# Patient Record
Sex: MALE | Race: WHITE | Employment: FULL TIME | URBAN - METROPOLITAN AREA
[De-identification: names, ages, dates, MRNs, and addresses within clinical notes are randomized per-mention and may not be internally consistent; named-entity substitution may affect disease eponyms.]

---

## 2017-02-10 PROBLEM — R10.13 EPIGASTRIC ABDOMINAL PAIN: Status: ACTIVE | Noted: 2017-02-10

## 2017-02-11 PROBLEM — K25.9 GASTRIC ULCER: Status: ACTIVE | Noted: 2017-02-11

## 2017-02-11 PROBLEM — K20.0 EOSINOPHILIC ESOPHAGITIS: Status: ACTIVE | Noted: 2017-02-11

## 2017-02-18 ENCOUNTER — HOSPITAL ENCOUNTER (OUTPATIENT)
Dept: OTHER | Age: 32
Discharge: HOME | End: 2017-02-18
Attending: INTERNAL MEDICINE | Admitting: INTERNAL MEDICINE

## 2017-02-18 LAB
C-REACTIVE PROTEIN: 3.6 MG/L (ref 0–5.1)
SEDIMENTATION RATE, ERYTHROCYTE: 17 MM/HR (ref 0–15)

## 2017-02-20 LAB
ANA INTERPRETATION: NORMAL
ANCA IFA: NORMAL
ANTI-NUCLEAR ANTIBODY (ANA): NEGATIVE

## 2017-02-22 LAB — MISCELLANEOUS LAB TEST ORDER: NORMAL

## 2017-03-08 PROBLEM — F41.9 ANXIETY: Status: ACTIVE | Noted: 2017-03-08

## 2017-03-08 PROBLEM — K21.9 GERD (GASTROESOPHAGEAL REFLUX DISEASE): Status: ACTIVE | Noted: 2017-03-08

## 2017-03-17 PROBLEM — R74.8 ELEVATED LIPASE: Status: ACTIVE | Noted: 2017-03-17

## 2017-03-17 PROBLEM — N18.6 ESRD (END STAGE RENAL DISEASE) (HCC): Status: ACTIVE | Noted: 2017-03-17

## 2017-03-17 PROBLEM — R04.2 HEMOPTYSIS: Status: ACTIVE | Noted: 2017-03-17

## 2017-03-17 PROBLEM — R07.9 CHEST PAIN: Status: ACTIVE | Noted: 2017-03-17

## 2017-06-02 PROBLEM — K85.90 PANCREATITIS: Status: ACTIVE | Noted: 2017-06-02

## 2017-06-02 PROBLEM — R10.9 ABDOMINAL PAIN: Status: ACTIVE | Noted: 2017-06-02

## 2017-06-02 PROBLEM — Z99.2 HEMODIALYSIS PATIENT (HCC): Status: ACTIVE | Noted: 2017-06-02

## 2017-06-02 PROBLEM — N18.9 CHRONIC KIDNEY DISEASE: Status: ACTIVE | Noted: 2017-06-02

## 2017-06-22 ENCOUNTER — HOSPITAL ENCOUNTER (OUTPATIENT)
Dept: SURGERY | Age: 32
Discharge: HOME | End: 2017-06-22
Attending: INTERNAL MEDICINE | Admitting: INTERNAL MEDICINE

## 2017-06-22 VITALS
SYSTOLIC BLOOD PRESSURE: 114 MMHG | DIASTOLIC BLOOD PRESSURE: 60 MMHG | OXYGEN SATURATION: 100 % | HEART RATE: 62 BPM | RESPIRATION RATE: 16 BRPM | WEIGHT: 181 LBS | HEIGHT: 70 IN | TEMPERATURE: 98.1 F | BODY MASS INDEX: 25.91 KG/M2

## 2017-06-22 DIAGNOSIS — R93.3 ABNORMAL FINDINGS ON DIAGNOSTIC IMAGING OF OTHER PARTS OF DIGESTIVE TRACT: ICD-10-CM

## 2017-06-22 LAB
ANION GAP SERPL CALCULATED.3IONS-SCNC: 15 MMOL/L (ref 3–16)
BUN BLDV-MCNC: 21 MG/DL (ref 7–20)
CALCIUM SERPL-MCNC: 9.8 MG/DL (ref 8.3–10.6)
CHLORIDE BLD-SCNC: 95 MMOL/L (ref 99–110)
CO2: 31 MMOL/L (ref 21–32)
CREAT SERPL-MCNC: 6.3 MG/DL (ref 0.9–1.3)
GFR AFRICAN AMERICAN: 13
GFR NON-AFRICAN AMERICAN: 10
GLUCOSE BLD-MCNC: 98 MG/DL (ref 70–99)
POTASSIUM SERPL-SCNC: 4.3 MMOL/L (ref 3.5–5.1)
SODIUM BLD-SCNC: 141 MMOL/L (ref 136–145)

## 2017-06-22 RX ORDER — SODIUM CHLORIDE, SODIUM LACTATE, POTASSIUM CHLORIDE, CALCIUM CHLORIDE 600; 310; 30; 20 MG/100ML; MG/100ML; MG/100ML; MG/100ML
INJECTION, SOLUTION INTRAVENOUS ONCE
Status: DISCONTINUED | OUTPATIENT
Start: 2017-06-22 | End: 2017-06-23 | Stop reason: HOSPADM

## 2017-06-22 RX ORDER — SODIUM CHLORIDE 9 MG/ML
INJECTION, SOLUTION INTRAVENOUS CONTINUOUS
Status: DISCONTINUED | OUTPATIENT
Start: 2017-06-22 | End: 2017-06-23 | Stop reason: HOSPADM

## 2017-06-22 RX ORDER — LIDOCAINE HYDROCHLORIDE 10 MG/ML
0.1 INJECTION, SOLUTION EPIDURAL; INFILTRATION; INTRACAUDAL; PERINEURAL
Status: ACTIVE | OUTPATIENT
Start: 2017-06-22 | End: 2017-06-22

## 2017-06-22 RX ADMIN — SODIUM CHLORIDE: 9 INJECTION, SOLUTION INTRAVENOUS at 08:37

## 2017-06-22 ASSESSMENT — ENCOUNTER SYMPTOMS
CONSTIPATION: 1
ABDOMINAL PAIN: 1

## 2017-06-22 ASSESSMENT — PAIN - FUNCTIONAL ASSESSMENT: PAIN_FUNCTIONAL_ASSESSMENT: 0-10

## 2022-06-14 ENCOUNTER — APPOINTMENT (OUTPATIENT)
Dept: GENERAL RADIOLOGY | Age: 37
End: 2022-06-14
Payer: COMMERCIAL

## 2022-06-14 ENCOUNTER — APPOINTMENT (OUTPATIENT)
Dept: CT IMAGING | Age: 37
End: 2022-06-14
Payer: COMMERCIAL

## 2022-06-14 ENCOUNTER — HOSPITAL ENCOUNTER (EMERGENCY)
Age: 37
Discharge: HOME | End: 2022-06-14
Payer: COMMERCIAL

## 2022-06-14 VITALS
HEART RATE: 69 BPM | DIASTOLIC BLOOD PRESSURE: 97 MMHG | RESPIRATION RATE: 13 BRPM | OXYGEN SATURATION: 97 % | TEMPERATURE: 98 F | BODY MASS INDEX: 25.77 KG/M2 | HEIGHT: 70 IN | WEIGHT: 180 LBS | SYSTOLIC BLOOD PRESSURE: 135 MMHG

## 2022-06-14 DIAGNOSIS — R07.9 CHEST PAIN, UNSPECIFIED TYPE: Primary | ICD-10-CM

## 2022-06-14 DIAGNOSIS — R04.2 HEMOPTYSIS: ICD-10-CM

## 2022-06-14 DIAGNOSIS — R93.2 ABNORMAL CT OF LIVER: ICD-10-CM

## 2022-06-14 LAB
A/G RATIO: 2.2 (ref 1.1–2.2)
ALBUMIN SERPL-MCNC: 4.7 G/DL (ref 3.4–5)
ALP BLD-CCNC: 88 U/L (ref 40–129)
ALT SERPL-CCNC: 56 U/L (ref 10–40)
ANION GAP SERPL CALCULATED.3IONS-SCNC: 12 MMOL/L (ref 3–16)
AST SERPL-CCNC: 37 U/L (ref 15–37)
BASOPHILS ABSOLUTE: 0 K/UL (ref 0–0.2)
BASOPHILS RELATIVE PERCENT: 1.1 %
BILIRUB SERPL-MCNC: 0.4 MG/DL (ref 0–1)
BILIRUBIN URINE: NEGATIVE
BLOOD, URINE: NEGATIVE
BUN BLDV-MCNC: 12 MG/DL (ref 7–20)
CALCIUM SERPL-MCNC: 10 MG/DL (ref 8.3–10.6)
CHLORIDE BLD-SCNC: 104 MMOL/L (ref 99–110)
CLARITY: CLEAR
CO2: 23 MMOL/L (ref 21–32)
COLOR: YELLOW
CREAT SERPL-MCNC: 1.3 MG/DL (ref 0.9–1.3)
EKG ATRIAL RATE: 89 BPM
EKG DIAGNOSIS: NORMAL
EKG P AXIS: 45 DEGREES
EKG P-R INTERVAL: 154 MS
EKG Q-T INTERVAL: 358 MS
EKG QRS DURATION: 88 MS
EKG QTC CALCULATION (BAZETT): 435 MS
EKG R AXIS: 25 DEGREES
EKG T AXIS: 26 DEGREES
EKG VENTRICULAR RATE: 89 BPM
EOSINOPHILS ABSOLUTE: 0.3 K/UL (ref 0–0.6)
EOSINOPHILS RELATIVE PERCENT: 7 %
EPITHELIAL CELLS, UA: NORMAL /HPF (ref 0–5)
GFR AFRICAN AMERICAN: >60
GFR NON-AFRICAN AMERICAN: >60
GLUCOSE BLD-MCNC: 177 MG/DL (ref 70–99)
GLUCOSE URINE: NEGATIVE MG/DL
HCT VFR BLD CALC: 43.2 % (ref 40.5–52.5)
HEMOGLOBIN: 14.6 G/DL (ref 13.5–17.5)
KETONES, URINE: NEGATIVE MG/DL
LEUKOCYTE ESTERASE, URINE: NEGATIVE
LYMPHOCYTES ABSOLUTE: 1.6 K/UL (ref 1–5.1)
LYMPHOCYTES RELATIVE PERCENT: 36.4 %
MCH RBC QN AUTO: 27.7 PG (ref 26–34)
MCHC RBC AUTO-ENTMCNC: 33.8 G/DL (ref 31–36)
MCV RBC AUTO: 81.9 FL (ref 80–100)
MICROSCOPIC EXAMINATION: YES
MONOCYTES ABSOLUTE: 0.5 K/UL (ref 0–1.3)
MONOCYTES RELATIVE PERCENT: 10.7 %
NEUTROPHILS ABSOLUTE: 1.9 K/UL (ref 1.7–7.7)
NEUTROPHILS RELATIVE PERCENT: 44.8 %
NITRITE, URINE: NEGATIVE
PDW BLD-RTO: 13.8 % (ref 12.4–15.4)
PH UA: 7.5 (ref 5–8)
PLATELET # BLD: 239 K/UL (ref 135–450)
PMV BLD AUTO: 7.6 FL (ref 5–10.5)
POTASSIUM SERPL-SCNC: 4 MMOL/L (ref 3.5–5.1)
PROTEIN UA: ABNORMAL MG/DL
RBC # BLD: 5.28 M/UL (ref 4.2–5.9)
RBC UA: NORMAL /HPF (ref 0–4)
SODIUM BLD-SCNC: 139 MMOL/L (ref 136–145)
SPECIFIC GRAVITY UA: 1.01 (ref 1–1.03)
TOTAL PROTEIN: 6.8 G/DL (ref 6.4–8.2)
TROPONIN: <0.01 NG/ML
TROPONIN: <0.01 NG/ML
URINE REFLEX TO CULTURE: ABNORMAL
URINE TYPE: ABNORMAL
UROBILINOGEN, URINE: 0.2 E.U./DL
WBC # BLD: 4.3 K/UL (ref 4–11)
WBC UA: NORMAL /HPF (ref 0–5)

## 2022-06-14 PROCEDURE — 84484 ASSAY OF TROPONIN QUANT: CPT

## 2022-06-14 PROCEDURE — 93010 ELECTROCARDIOGRAM REPORT: CPT | Performed by: INTERNAL MEDICINE

## 2022-06-14 PROCEDURE — 71045 X-RAY EXAM CHEST 1 VIEW: CPT

## 2022-06-14 PROCEDURE — 81001 URINALYSIS AUTO W/SCOPE: CPT

## 2022-06-14 PROCEDURE — 6370000000 HC RX 637 (ALT 250 FOR IP): Performed by: NURSE PRACTITIONER

## 2022-06-14 PROCEDURE — 99285 EMERGENCY DEPT VISIT HI MDM: CPT

## 2022-06-14 PROCEDURE — 85025 COMPLETE CBC W/AUTO DIFF WBC: CPT

## 2022-06-14 PROCEDURE — 6360000004 HC RX CONTRAST MEDICATION: Performed by: NURSE PRACTITIONER

## 2022-06-14 PROCEDURE — 93005 ELECTROCARDIOGRAM TRACING: CPT | Performed by: NURSE PRACTITIONER

## 2022-06-14 PROCEDURE — 2580000003 HC RX 258: Performed by: NURSE PRACTITIONER

## 2022-06-14 PROCEDURE — 80053 COMPREHEN METABOLIC PANEL: CPT

## 2022-06-14 PROCEDURE — 71260 CT THORAX DX C+: CPT

## 2022-06-14 RX ORDER — 0.9 % SODIUM CHLORIDE 0.9 %
1000 INTRAVENOUS SOLUTION INTRAVENOUS ONCE
Status: COMPLETED | OUTPATIENT
Start: 2022-06-14 | End: 2022-06-14

## 2022-06-14 RX ORDER — ACETAMINOPHEN 500 MG
1000 TABLET ORAL ONCE
Status: COMPLETED | OUTPATIENT
Start: 2022-06-14 | End: 2022-06-14

## 2022-06-14 RX ADMIN — ACETAMINOPHEN 1000 MG: 500 TABLET ORAL at 11:20

## 2022-06-14 RX ADMIN — IOPAMIDOL 75 ML: 755 INJECTION, SOLUTION INTRAVENOUS at 12:37

## 2022-06-14 RX ADMIN — SODIUM CHLORIDE 1000 ML: 9 INJECTION, SOLUTION INTRAVENOUS at 12:47

## 2022-06-14 ASSESSMENT — PAIN - FUNCTIONAL ASSESSMENT: PAIN_FUNCTIONAL_ASSESSMENT: 0-10

## 2022-06-14 ASSESSMENT — PAIN SCALES - GENERAL
PAINLEVEL_OUTOF10: 8
PAINLEVEL_OUTOF10: 8

## 2022-06-14 NOTE — ED PROVIDER NOTES
Evaluated by Advanced Practice Provider    [facility]  ED    CHIEF COMPLAINT  Chest Pain (started while driving to work at this am, no radiating pain, constant pain, \" drilling feeling\")    HISTORY OF PRESENT ILLNESS  Mendy Lagos is a 40 y.o. male who presents to the ED complaining of CP. Was driving to work and started coughing up blood and then his chest started hurting, right in the middle of the sternum. This started around 10 am, pain has been constant since it started. Describes the pain as like someone is running a drill through you. Pain does not radiate. Felt more SOB when he was coughing, like he was coughing all the air out of him. He had to stop and pull over, couldn't catch his breath. He now feels fine breathing wise. He felt a little nauseous, no vomiting. Denies being sick prior to this. Reports coughing up blood, states his hand was covered and it looked like a clot, it was just one time. Denies abdominal pain. He is numb/tingling all over, feels like his body has gone to sleep. He has a strong numbness behind his eyes. Has had kidney transplant-6 years ago and history of testicular cancer-6 years ago. The patient is currently rating their pain as 8/10 and describes it as a drilling, sharp type of pain. The patient denies history of a blood clot in the past.   The patient denies history of surgery, travel, or trauma in the past 4 weeks. The patient reports cough/hemoptysis. The patient does not take estrogen supplements. There is no unilateral leg swelling. Treatments tried prior to arrival in the ED include: none. The patient arrived to the ED via private car.     PAST MEDICAL HISTORY    Past Medical History:   Diagnosis Date    ARF (acute renal failure) ([address]     Asthma     as a child    Cancer [facility]     testicular    Dialysis patient ([facility] 12/2016    M,W,F    E. coli gastroenteritis     AGE 6, HOSPITALIZED    ESRD (end stage renal disease) [facility]     Gastric ulcer     Hypertension        SURGICAL HISTORY    Past Surgical History:   Procedure Laterality Date    ANTERIOR CRUCIATE LIGAMENT REPAIR Right     COLONOSCOPY  2017    COLONOSCOPY  2017    bx    DIALYSIS FISTULA CREATION  2016    HERNIA REPAIR Bilateral     INGUINAL, AS BABY    KIDNEY TRANSPLANT      NASAL FRACTURE SURGERY      x2    UPPER GASTROINTESTINAL ENDOSCOPY  2017    bx small bowel     CURRENT MEDICATIONS    Current Outpatient Rx   Medication Sig Dispense Refill    gabapentin (NEURONTIN) 300 MG capsule Take 1 po qhs X1 wk, 1 po BID X1 wk then 1 po TID thereafter 90 capsule 0    Ferric Citrate (AURYXIA PO) Take 3 tablets by mouth 3 times daily With meals      hydrALAZINE (APRESOLINE) 50 MG tablet Take 50 mg by mouth 2 times daily      amLODIPine (NORVASC) 10 MG tablet Take 10 mg by mouth daily      fluticasone (FLONASE) 50 MCG/ACT nasal spray 1 spray by Nasal route as needed       Fexofenadine HCl (ALLEGRA ALLERGY PO) Take 1 tablet by mouth daily as needed       NONFORMULARY daily Dialysis Vitamin        ALLERGIES    Allergies   Allergen Reactions    Losartan Swelling    Morphine Itching     FAMILY HISTORY    History reviewed. No pertinent family history.     SOCIAL HISTORY    Social History     Socioeconomic History    Marital status: Single     Spouse name: None    Number of children: None    Years of education: None    Highest education level: None   Occupational History    None   Tobacco Use    Smoking status: Former Smoker     Packs/day: 0.25     Years: 10.00     Pack years: 2.50     Types: Cigarettes     Quit date: 10/5/2010     Years since quittin.6    Smokeless tobacco: Former User     Types: Chew   Substance and Sexual Activity    Alcohol use: No    Drug use: No    Sexual activity: None   Other Topics Concern    None   Social History Narrative    None     Social Determinants of Health     Financial Resource Strain:    [facility] Difficulty of Paying Living Expenses: Not on file   Food Insecurity:     Worried About Running Out of Food in the Last Year: Not on file    Ran Out of Food in the Last Year: Not on file   Transportation Needs:     Lack of Transportation (Medical): Not on file    Lack of Transportation (Non-Medical): Not on file   Physical Activity:     Days of Exercise per Week: Not on file    Minutes of Exercise per Session: Not on file   Stress:     Feeling of Stress : Not on file   Social Connections:     Frequency of Communication with Friends and Family: Not on file    Frequency of Social Gatherings with Friends and Family: Not on file    Attends Confucianist Services: Not on file    Active Member of [address] [place] [address] [employer] or Organizations: Not on file    Attends Club or Organization Meetings: Not on file    Marital Status: Not on file   Intimate Partner Violence:     Fear of Current or Ex-Partner: Not on file    Emotionally Abused: Not on file    Physically Abused: Not on file    Sexually Abused: Not on file   Housing Stability:     Unable to Pay for Housing in the Last Year: Not on file    Number of [place] in the Last Year: Not on file    Unstable Housing in the Last Year: Not on file     REVIEW OF SYSTEMS    10 systems reviewed, pertinent positives per HPI otherwise noted to be negative    PHYSICAL EXAM  Vitals:    06/14/22 1451   BP: (!) 135/97   Pulse: 69   Resp: 13   Temp:    SpO2: 97%     GENERAL: Patient is well-developed, well nourished. Awake and alert. Cooperative. Resting in bed. No apparent distress. HEENT:  Normocephalic, atraumatic. Conjunctiva appear normal. Sclera is non-icteric. External ears are normal.    NECK: Supple with normal ROM. Trachea midline. LUNGS: Equal and symmetric chest rise. Breathing is unlabored. Speaking comfortably in full sentences. Lungs are clear bilaterally to auscultation. Without wheezing, rales, or rhonchi. Lower sternum tenderness to palpation.    CADIOVASCULAR:  Regular >60 >60    Calcium 10.0 8.3 - 10.6 mg/dL    Total Protein 6.8 6.4 - 8.2 g/dL    Albumin 4.7 3.4 - 5.0 g/dL    Albumin/Globulin Ratio 2.2 1.1 - 2.2    Total Bilirubin 0.4 0.0 - 1.0 mg/dL    Alkaline Phosphatase 88 40 - 129 U/L    ALT 56 (H) 10 - 40 U/L    AST 37 15 - 37 U/L   Troponin   Result Value Ref Range    Troponin <0.01 <0.01 ng/mL   Urinalysis with Reflex to Culture    Specimen: Urine, clean catch   Result Value Ref Range    Color, UA Yellow Straw/Yellow    Clarity, UA Clear Clear    Glucose, Ur Negative Negative mg/dL    Bilirubin Urine Negative Negative    Ketones, Urine Negative Negative mg/dL    Specific Gravity, UA 1.015 1.005 - 1.030    Blood, Urine Negative Negative    pH, UA 7.5 5.0 - 8.0    Protein, UA TRACE (A) Negative mg/dL    Urobilinogen, Urine 0.2 <2.0 E.U./dL    Nitrite, Urine Negative Negative    Leukocyte Esterase, Urine Negative Negative    Microscopic Examination YES     Urine Type NotGiven     Urine Reflex to Culture Not Indicated    Microscopic Urinalysis   Result Value Ref Range    WBC, UA None seen 0 - 5 /HPF    RBC, UA 0-2 0 - 4 /HPF    Epithelial Cells, UA 0-1 0 - 5 /HPF   Troponin   Result Value Ref Range    Troponin <0.01 <0.01 ng/mL   EKG 12 Lead   Result Value Ref Range    Ventricular Rate 89 BPM    Atrial Rate 89 BPM    P-R Interval 154 ms    QRS Duration 88 ms    Q-T Interval 358 ms    QTc Calculation (Bazett) 435 ms    P Axis 45 degrees    R Axis 25 degrees    T Axis 26 degrees    Diagnosis       Normal sinus rhythmNormal ECGWhen compared with ECG of 04-AUG-2017 06:39,No significant change was found       RADIOLOGY    XR CHEST PORTABLE    Result Date: 6/14/2022  EXAMINATION: ONE XRAY VIEW OF THE CHEST 6/14/2022 10:24 am COMPARISON: Prior studies most recent 08/04/2017.  HISTORY: ORDERING SYSTEM PROVIDED HISTORY: chest pain TECHNOLOGIST PROVIDED HISTORY: Reason for exam:->chest pain Reason for Exam: chest pain, coughing blood FINDINGS: Patient is slightly rotated in RPO position. Electrocardiographic monitor leads overlie the patient's chest. Cardiopericardial silhouette is within normal limits for an AP film. Pulmonary vasculature is normal.  No focal confluent pulmonary infiltrate is identified. Mild prominence of markings in the lateral right lung base may represent overlapping vessels although minimal parenchymal disease/atelectasis not excluded. No evidence of pleural effusion or pneumothorax. 1. No evidence of focal confluent pulmonary infiltrate. 2. Mild prominence of markings in the lateral right lung base. Finding may represent crowded vessels although minimal parenchymal disease/atelectasis not excluded. Short-term follow-up formal PA and lateral views of the chest may be helpful for re-evaluation when the patient is clinically able. CT CHEST PULMONARY EMBOLISM W CONTRAST    Result Date: 6/14/2022  EXAMINATION: CTA OF THE CHEST 6/14/2022 12:28 pm TECHNIQUE: CTA of the chest was performed after the administration of intravenous contrast.  Multiplanar reformatted images are provided for review. MIP images are provided for review. Automated exposure control, iterative reconstruction, and/or weight based adjustment of the mA/kV was utilized to reduce the radiation dose to as low as reasonably achievable. COMPARISON: 08/04/2017, 03/17/2017 HISTORY: ORDERING SYSTEM PROVIDED HISTORY: CP, hemoptysis, h/o cancer TECHNOLOGIST PROVIDED HISTORY: Reason for exam:->CP, hemoptysis, h/o cancer Decision Support Exception - unselect if not a suspected or confirmed emergency medical condition->Emergency Medical Condition (MA) Reason for Exam: hemoptysis with chest pain since 10am today Additional signs and symptoms: hx-testiculat ca 6 yrs-surg only Relevant Medical/Surgical History: kidney transplant 6 yrs ago FINDINGS: Pulmonary Arteries: Pulmonary arteries are adequately opacified for evaluation. No evidence of intraluminal filling defect to suggest pulmonary embolism. Main pulmonary artery is normal in caliber. Mediastinum: The thyroid is unremarkable. There is no pathologic lymphadenopathy within the chest or mediastinum. The intrathoracic aorta is unremarkable, without evidence of aneurysm or dissection. No pericardial abnormality is identified. Lungs/pleura: The central airways are patent. There is mild dependent atelectasis within both lungs. The lungs are otherwise clear, without acute airspace consolidation. There is no evidence of a pneumothorax or pleural effusion. No suspicious pleural or parenchymal nodule or mass is identified. Upper Abdomen: The adrenal glands are unremarkable bilaterally. There is chronic bilateral renal atrophy. There is a nonspecific geographic focus of low attenuation right hepatic lobe, near the hepatic dome, which is not definitely identified on the prior studies, and could represent either focal fatty infiltration or possibly transit hepatic attenuation difference. The remainder of the upper abdomen is unremarkable. Soft Tissues/Bones: There is minimal degenerative change throughout the thoracic spine. No osteolytic or osteoblastic lesion is identified. There is bilateral gynecomastia. 1. No CT evidence of a pulmonary embolism. 2. No acute intrapulmonary findings. 3. No CT evidence of intrathoracic metastatic disease. 4. Nonspecific geographic focus of relative low-attenuation within the right hepatic lobe near the hepatic dome, likely either focal fatty infiltration or a transient hepatic attenuation difference. However, suggest further characterization with a dedicated liver mass protocol CT or MRI study. HEART SCORE:    History: +0 for low suspicion  EKG: +0 for normal EKG   Age: [de-identified] for age <45 years  Risk factors (includes HLD, HTN, DM, tobacco use, obesity, and +FHx): +1 for 1-2 risk factors  Initial troponin: +0 for negative troponin    Heart score: 1.   This falls under the following category: Score of 0-3, which indicates a very low risk for major adverse cardiac event and supports early discharge    Score 0-3 0.9%-1.7% risk of major adverse cardiac event (MACE). In the HEART Score study, these patients were discharged. ED COURSE/MDM  Patient seen and evaluated. Old records reviewed. Diagnostic testing reviewed and results discussed. I have evaluated this patient. My supervising physician was available for consultation. Selam Moulton presented to the ED today with above noted complaints. Arrival vital signs: Afebrile and hemodynamically stable except for blood pressure elevated at 152/110. Well saturated on room air. Physical exam performed at 1050: No adventitious breath sounds on exam.  There is lower sternal tenderness to palpation. There is also some mild lower abdominal tenderness to palpation, patient reports he has had this since his kidney transplant. Blood work: No evidence of systemic infection. No anemia. No electrolyte abnormality. No evidence of acute kidney injury, ALT slightly elevated at 56, AST is normal.  Troponin is negative x2. EKG: Shows normal sinus rhythm and no acute findings. UA: Without evidence of infection. No blood. Imaging: Chest x-ray shows no evidence of focal confluent pulmonary infiltrate. Mild prominence of the markings in the lateral right lung base. Findings may represent carotid vessels although minimal parenchymal disease/atelectasis not excluded. Short-term follow-up formal PA and lateral views of the chest may be helpful. Patient has history of renal transplant, creatinine 1.3, GFR >60, I discussed obtaining CT chest to r/o PE with my attending physician Dr. Tony Hoyos, who did not see the patient with me, he agreed that the CP with hemoptysis in someone with prior history of cancer the benefits outweighed the risks in obtaining this test. He agreed with IV hydration and CT scan to r/o PE.      Medications given in the ED:   Medications   acetaminophen (TYLENOL) tablet 1,000 mg (1,000 mg Oral Given 6/14/22 1120)   0.9 % sodium chloride bolus (0 mLs IntraVENous Stopped 6/14/22 1347)   iopamidol (ISOVUE-370) 76 % injection 75 mL (75 mLs IntraVENous Given 6/14/22 1237)      I advise the patient of the CT findings of the abnormality of the liver and recommended he see his PCP to discuss further follow up. Patient has a HEART score of 1, negative EKG and 2 negative troponins in the ER. I feel risk of cardiac ischemia as cause to his symptoms is low. At this point I do not feel the patient requires further work up and it is reasonable to discharge the patient. Please refer to AVS for further details regarding discharge instructions. A discussion was had with the patient regarding diagnosis, diagnostic testing results, treatment/ plan of care, and follow up. All questions were answered. Patient will follow up as directed for further evaluation/treatment. The patient was given strict return precautions as we discussed symptoms that would necessitate return to the ED. Patient will return to ED for new/worsening symptoms. The patient verbalized their understanding and agreement with the above plan. I estimate there is LOW risk for ACUTE CORONARY SYNDROME, INTRACRANIAL HEMORRHAGE, MALIGNANT DYSRHYTHMIA or HYPERTENSION, PULMONARY EMBOLISM, SEPSIS, SUBARACHNOID HEMORRHAGE, SUBDURAL HEMATOMA, STROKE, or THORACIC AORTIC DISSECTION, thus I consider the discharge disposition reasonable. Endy Nunn and I have discussed the diagnosis and risks, and we agree with discharging home to follow-up with their primary doctor. We also discussed returning to the Emergency Department immediately if new or worsening symptoms occur. We have discussed the symptoms which are most concerning (e.g., bloody sputum, fever, worsening pain or shortness of breath, vomiting, weakness) that necessitate immediate return.      Patient was sent home with a prescription for below medication/s. I did [place] patient on appropriate use of these medication. Discharge Medication List as of 6/14/2022  2:52 PM          CLINICAL IMPRESSION    1. Chest pain, unspecified type    2. Hemoptysis    3. Abnormal CT of liver           Discharge Vitals:  Blood pressure (!) 135/97, pulse 69, temperature 98 °F (36.7 °C), resp. rate 13, height 5' 10\" (1.778 m), weight 180 lb (81.6 kg), SpO2 97 %. FOLLOW UP  Saida Shanks [place] [address] [facility] [address] [facility] [address]  [phone]    Go on 6/22/2022  For further evaluation    [facility]  ED  [address]  Go to   If symptoms worsen      DISPOSITION  Patient was discharged to home in good condition. Comment: Please note this report has been produced using speech recognition software and may contain errors related to that system including errors in grammar, punctuation, and spelling, as well as words and phrases that may be inappropriate. If there are any questions or concerns please feel free to contact the dictating provider for clarification.         Exie Areas, APRN - CNP  06/14/22 7440